# Patient Record
Sex: FEMALE
[De-identification: names, ages, dates, MRNs, and addresses within clinical notes are randomized per-mention and may not be internally consistent; named-entity substitution may affect disease eponyms.]

---

## 2017-05-24 ENCOUNTER — HOSPITAL (OUTPATIENT)
Dept: OTHER | Age: 37
End: 2017-05-24
Attending: OBSTETRICS & GYNECOLOGY

## 2017-05-24 LAB
ANALYZER ANC (IANC): ABNORMAL
APPEARANCE UR: CLEAR
BILIRUB UR QL STRIP: NEGATIVE
COLOR UR: YELLOW
ERYTHROCYTE [DISTWIDTH] IN BLOOD: 14 % (ref 11–15)
GLUCOSE UR STRIP-MCNC: NEGATIVE MG/DL
HEMATOCRIT: 32.9 % (ref 36–46.5)
HEMOCCULT STL QL: ABNORMAL
HGB BLD-MCNC: 11.1 GM/DL (ref 12–15.5)
KETONES UR STRIP-MCNC: >160 MG/DL
LEUKOCYTE ESTERASE UR QL STRIP: NEGATIVE
MCH RBC QN AUTO: 28.9 PG (ref 26–34)
MCHC RBC AUTO-ENTMCNC: 33.7 GM/DL (ref 32–36.5)
MCV RBC AUTO: 85.7 FL (ref 78–100)
NITRITE UR QL STRIP: NEGATIVE
PH UR STRIP: 6 UNIT (ref 5–7)
PLATELET # BLD: 218 THOUSAND/MCL (ref 140–450)
PROT UR STRIP-MCNC: 30 MG/DL
RBC # BLD: 3.84 MILLION/MCL (ref 4–5.2)
SP GR UR STRIP: 1.02 (ref 1–1.03)
UROBILINOGEN UR STRIP-MCNC: 0.2 MG/DL (ref 0–1)
WBC # BLD: 8.4 THOUSAND/MCL (ref 4.2–11)

## 2017-05-25 LAB
HEMATOCRIT: 26.4 % (ref 36–46.5)
HGB BLD-MCNC: 8.8 GM/DL (ref 12–15.5)

## 2020-11-30 ENCOUNTER — TELEPHONE (OUTPATIENT)
Dept: PERINATAL CARE | Facility: HOSPITAL | Age: 40
End: 2020-11-30

## 2020-11-30 ENCOUNTER — HOSPITAL ENCOUNTER (OUTPATIENT)
Dept: PERINATAL CARE | Facility: HOSPITAL | Age: 40
Discharge: HOME | End: 2020-11-30
Attending: NURSE PRACTITIONER
Payer: MEDICAID

## 2020-11-30 VITALS
DIASTOLIC BLOOD PRESSURE: 58 MMHG | SYSTOLIC BLOOD PRESSURE: 109 MMHG | HEART RATE: 101 BPM | BODY MASS INDEX: 31 KG/M2 | WEIGHT: 170 LBS

## 2020-11-30 DIAGNOSIS — O09.522 AMA (ADVANCED MATERNAL AGE) MULTIGRAVIDA 35+, SECOND TRIMESTER: ICD-10-CM

## 2020-11-30 DIAGNOSIS — Z36.3 ENCOUNTER FOR ANTENATAL SCREENING FOR MALFORMATION USING ULTRASOUND: ICD-10-CM

## 2020-11-30 DIAGNOSIS — Z36.3 ENCOUNTER FOR ANTENATAL SCREENING FOR MALFORMATION USING ULTRASOUND: Primary | ICD-10-CM

## 2020-11-30 PROCEDURE — 99215 OFFICE O/P EST HI 40 MIN: CPT | Performed by: OBSTETRICS & GYNECOLOGY

## 2020-11-30 PROCEDURE — 76811 OB US DETAILED SNGL FETUS: CPT | Performed by: OBSTETRICS & GYNECOLOGY

## 2020-11-30 PROCEDURE — 76811 OB US DETAILED SNGL FETUS: CPT | Performed by: NURSE PRACTITIONER

## 2020-11-30 NOTE — PROGRESS NOTES
Outpatient Maternal-Fetal Medicine Consultation    Dear Dr. Deleon Hidden    Thank you for requesting ultrasound evaluation and maternal fetal medicine consultation on your patient Kecia Mercer.   As you are aware she is a 47year old female [id number] with a si Mood and Affect: Mood normal.         Behavior: Behavior normal.         Thought Content: Thought content normal.         Judgment: Judgment normal.   Vitals signs and nursing note reviewed.            OBSTETRIC ULTRASOUND  The patient had a level II ultras hospitalization,  delivery, and pregnancy-related complications when compared to their younger counterparts. The two most common medical problems complicating these  pregnanccies are hypertension and diabetes.    The incidence of preeclampsia in th unrelated to aneuploidy, thus they would not be detected by karyotype analysis. For these reasons a complete, detailed ultrasound (level II) is advised even if the fetus has a normal karyotype.       Fetal Aneuploidy      Invasive Testing  I offered invasi second trimester and is found pyelectasis in 10 to 25 percent of fetuses with Down syndrome and 2-3% percent of euploid fetuses. Aneuploidy is present in 0.3 to 0.9 percent of fetuses with isolated pyelectasis.   Other studies have found the likelihood rati Once a critical titer is reached, ultrasound plays a key role in the management of the alloimmunized pregnancy.  It should be employed early in the pregnancy to establish the correct gestational age, since this parameter is important for determining no · Little c antigen testing of father of baby if possible. · Monthly antibody titer for patient. If 1:16, then begin weekly MCA Doppler velocimetry. 11-20 lb weight gain  Follow-up Growth ultrasound at 32 weeks. Weekly NST's at 36 weeks.   Delivery at no

## 2020-11-30 NOTE — TELEPHONE ENCOUNTER
Referral authorization requested Nanette for [facility] U/S scheduled on 2/8/2020. Cyrus Cross will give information to Valerie Harvey to process.

## 2021-01-27 PROBLEM — O09.523 MULTIGRAVIDA OF ADVANCED MATERNAL AGE IN THIRD TRIMESTER: Status: ACTIVE | Noted: 2021-01-27

## 2021-02-16 ENCOUNTER — HOSPITAL ENCOUNTER (OUTPATIENT)
Dept: PERINATAL CARE | Facility: HOSPITAL | Age: 41
Discharge: HOME | End: 2021-02-16
Attending: OBSTETRICS & GYNECOLOGY
Payer: MEDICAID

## 2021-02-16 VITALS
SYSTOLIC BLOOD PRESSURE: 108 MMHG | HEART RATE: 94 BPM | DIASTOLIC BLOOD PRESSURE: 48 MMHG | WEIGHT: 175 LBS | BODY MASS INDEX: 32 KG/M2

## 2021-02-16 DIAGNOSIS — O09.523 MULTIGRAVIDA OF ADVANCED MATERNAL AGE IN THIRD TRIMESTER: ICD-10-CM

## 2021-02-16 DIAGNOSIS — O35.8XX0 PYELECTASIS OF FETUS ON PRENATAL ULTRASOUND: ICD-10-CM

## 2021-02-16 DIAGNOSIS — O09.523 MULTIGRAVIDA OF ADVANCED MATERNAL AGE IN THIRD TRIMESTER: Primary | ICD-10-CM

## 2021-02-16 PROCEDURE — 76816 OB US FOLLOW-UP PER FETUS: CPT | Performed by: OBSTETRICS & GYNECOLOGY

## 2021-02-16 PROCEDURE — 76819 FETAL BIOPHYS PROFIL W/O NST: CPT | Performed by: OBSTETRICS & GYNECOLOGY

## 2021-02-16 PROCEDURE — 99214 OFFICE O/P EST MOD 30 MIN: CPT | Performed by: OBSTETRICS & GYNECOLOGY

## 2021-02-16 NOTE — PROGRESS NOTES
Outpatient Maternal-Fetal Medicine Consultation    Dear Dr. Elvie Sharma    Thank you for requesting ultrasound evaluation and maternal fetal medicine consultation on your patient Roselyn Burroughs.   As you are aware she is a 46year old female [id number] with a si Thought Content: Thought content normal.         Judgment: Judgment normal.   Vitals signs and nursing note reviewed. OBSTETRIC ULTRASOUND  Single IUP in cephalic presentation. Placenta is anterior. A 3 vessel cord is noted.   Cardiac ac and, on occasion, fetal demise. The father of the baby should be tested for c antigen zygosity  early in the course of evaluation of maternal alloimmunization.   A careful discussion should be undertaken with the pregnant patient of the serious impl the prediction of fetal anemia is increased and may not be accurate.     If Doppler velocimetry suggests severe fetal anemia (MCA PSV greater than 1.5 MoM's), then perform fetal blood sampling with blood ready for intrauterine transfusion if the fetal hemat

## 2022-12-27 ENCOUNTER — TELEPHONE (OUTPATIENT)
Dept: PERINATAL CARE | Facility: HOSPITAL | Age: 42
End: 2022-12-27

## 2023-01-20 ENCOUNTER — HOSPITAL ENCOUNTER (OUTPATIENT)
Dept: PERINATAL CARE | Facility: HOSPITAL | Age: 43
Discharge: HOME | End: 2023-01-20
Attending: OBSTETRICS & GYNECOLOGY
Payer: MEDICAID

## 2023-01-20 ENCOUNTER — TELEPHONE (OUTPATIENT)
Dept: PERINATAL CARE | Facility: HOSPITAL | Age: 43
End: 2023-01-20

## 2023-01-20 VITALS
SYSTOLIC BLOOD PRESSURE: 108 MMHG | BODY MASS INDEX: 32 KG/M2 | HEART RATE: 97 BPM | WEIGHT: 177 LBS | DIASTOLIC BLOOD PRESSURE: 65 MMHG

## 2023-01-20 DIAGNOSIS — O09.523 MULTIGRAVIDA OF ADVANCED MATERNAL AGE IN THIRD TRIMESTER: Primary | ICD-10-CM

## 2023-01-20 DIAGNOSIS — Z64.1 GRAND MULTIPARA: ICD-10-CM

## 2023-01-20 DIAGNOSIS — O09.523 MULTIGRAVIDA OF ADVANCED MATERNAL AGE IN THIRD TRIMESTER: ICD-10-CM

## 2023-01-20 PROCEDURE — 76811 OB US DETAILED SNGL FETUS: CPT | Performed by: OBSTETRICS & GYNECOLOGY

## 2023-01-20 NOTE — TELEPHONE ENCOUNTER
Called Dr Dario Santos office. Requested prior authorization for [facility] for appointment on 2/17/23.

## 2023-02-17 ENCOUNTER — HOSPITAL ENCOUNTER (OUTPATIENT)
Dept: PERINATAL CARE | Facility: HOSPITAL | Age: 43
Discharge: HOME | End: 2023-02-17
Attending: OBSTETRICS & GYNECOLOGY
Payer: MEDICAID

## 2023-02-17 VITALS
BODY MASS INDEX: 32 KG/M2 | SYSTOLIC BLOOD PRESSURE: 130 MMHG | WEIGHT: 178 LBS | HEART RATE: 89 BPM | DIASTOLIC BLOOD PRESSURE: 50 MMHG

## 2023-02-17 DIAGNOSIS — O09.523 MULTIGRAVIDA OF ADVANCED MATERNAL AGE IN THIRD TRIMESTER: Primary | ICD-10-CM

## 2023-02-17 DIAGNOSIS — O09.523 MULTIGRAVIDA OF ADVANCED MATERNAL AGE IN THIRD TRIMESTER: ICD-10-CM

## 2023-02-17 PROCEDURE — 76816 OB US FOLLOW-UP PER FETUS: CPT | Performed by: OBSTETRICS & GYNECOLOGY

## 2023-02-17 PROCEDURE — 76819 FETAL BIOPHYS PROFIL W/O NST: CPT
